# Patient Record
Sex: FEMALE | Race: BLACK OR AFRICAN AMERICAN | NOT HISPANIC OR LATINO | URBAN - METROPOLITAN AREA
[De-identification: names, ages, dates, MRNs, and addresses within clinical notes are randomized per-mention and may not be internally consistent; named-entity substitution may affect disease eponyms.]

---

## 2024-01-27 PROCEDURE — 87086 URINE CULTURE/COLONY COUNT: CPT

## 2024-01-28 ENCOUNTER — LAB REQUISITION (OUTPATIENT)
Dept: LAB | Facility: HOSPITAL | Age: 54
End: 2024-01-28

## 2024-01-28 DIAGNOSIS — N39.0 URINARY TRACT INFECTION, SITE NOT SPECIFIED: ICD-10-CM

## 2024-01-29 LAB — BACTERIA UR CULT: NORMAL

## 2025-06-14 ENCOUNTER — OFFICE VISIT (OUTPATIENT)
Dept: URGENT CARE | Age: 55
End: 2025-06-14
Payer: COMMERCIAL

## 2025-06-14 VITALS
HEART RATE: 80 BPM | DIASTOLIC BLOOD PRESSURE: 110 MMHG | SYSTOLIC BLOOD PRESSURE: 140 MMHG | TEMPERATURE: 98 F | WEIGHT: 188.93 LBS | BODY MASS INDEX: 29.59 KG/M2 | OXYGEN SATURATION: 99 %

## 2025-06-14 DIAGNOSIS — M79.604 LEG PAIN, ANTERIOR, RIGHT: Primary | ICD-10-CM

## 2025-06-14 DIAGNOSIS — M62.838 MUSCLE SPASM: ICD-10-CM

## 2025-06-14 RX ORDER — FLUOXETINE 20 MG/1
20 CAPSULE ORAL DAILY
COMMUNITY

## 2025-06-14 RX ORDER — CYCLOBENZAPRINE HCL 10 MG
10 TABLET ORAL NIGHTLY PRN
Qty: 10 TABLET | Refills: 0 | Status: SHIPPED | OUTPATIENT
Start: 2025-06-14

## 2025-06-14 RX ORDER — HYDROCHLOROTHIAZIDE 25 MG/1
25 TABLET ORAL DAILY
COMMUNITY

## 2025-06-14 RX ORDER — AMLODIPINE BESYLATE 10 MG/1
TABLET ORAL DAILY
COMMUNITY

## 2025-06-14 RX ORDER — LOSARTAN POTASSIUM 50 MG/1
50 TABLET ORAL DAILY
COMMUNITY

## 2025-06-14 RX ORDER — METFORMIN HYDROCHLORIDE 500 MG/1
500 TABLET ORAL
COMMUNITY

## 2025-06-14 RX ORDER — DULOXETIN HYDROCHLORIDE 60 MG/1
60 CAPSULE, DELAYED RELEASE ORAL DAILY
COMMUNITY

## 2025-06-14 RX ORDER — IBUPROFEN 800 MG/1
800 TABLET, FILM COATED ORAL EVERY 8 HOURS PRN
Qty: 30 TABLET | Refills: 0 | Status: SHIPPED | OUTPATIENT
Start: 2025-06-14 | End: 2025-06-24

## 2025-06-14 RX ORDER — PRAVASTATIN SODIUM 40 MG/1
TABLET ORAL NIGHTLY
COMMUNITY

## 2025-06-14 ASSESSMENT — PATIENT HEALTH QUESTIONNAIRE - PHQ9
1. LITTLE INTEREST OR PLEASURE IN DOING THINGS: NOT AT ALL
SUM OF ALL RESPONSES TO PHQ9 QUESTIONS 1 AND 2: 0
2. FEELING DOWN, DEPRESSED OR HOPELESS: NOT AT ALL

## 2025-06-14 NOTE — PROGRESS NOTES
Subjective   Patient ID: Sherrill Hawkins is a 54 y.o. female. They present today with a chief complaint of Fatigue (Fatigue, right upper leg muscle pain and soreness starting 1 week ago.). Advil alleviated the pain mildly       Past Medical History  Allergies as of 06/14/2025 - Reviewed 06/14/2025   Allergen Reaction Noted    Penicillin Rash 06/14/2025       Prescriptions Prior to Admission[1]     Medical History[2]    Surgical History[3]     reports that she has never smoked. She has never used smokeless tobacco. She reports that she does not drink alcohol.    Review of Systems  Review of Systems                               Objective    Vitals:    06/14/25 1340   BP: (!) 140/110   Pulse: 80   Temp: 36.7 °C (98 °F)   SpO2: 99%   Weight: 85.7 kg (188 lb 15 oz)     No LMP recorded. Patient has had a hysterectomy.    Physical Exam  Vitals reviewed.   Constitutional:       General: She is not in acute distress.  HENT:      Head: Normocephalic and atraumatic.      Nose: Nose normal.      Mouth/Throat:      Mouth: Mucous membranes are moist.   Eyes:      Extraocular Movements: Extraocular movements intact.      Conjunctiva/sclera: Conjunctivae normal.   Pulmonary:      Effort: Pulmonary effort is normal.   Musculoskeletal:      Right hip: Normal.      Right upper leg: Normal.      Right knee: Normal.      Right lower leg: Normal.      Right ankle: Normal.   Skin:     General: Skin is warm.   Neurological:      Mental Status: She is alert and oriented to person, place, and time.   Psychiatric:         Mood and Affect: Mood normal.         Behavior: Behavior normal.             Point of Care Test & Imaging Results from this visit  No results found for this visit on 06/14/25.   Imaging  No results found.    Cardiology, Vascular, and Other Imaging  No other imaging results found for the past 2 days      Diagnostic study results (if any) were reviewed by Alessio Munoz PA-C.    Assessment/Plan   Allergies, medications,  history, and pertinent labs/EKGs/Imaging reviewed by Alessio Munoz PA-C.     Medical Decision Making  -         Patient is educated about their diagnoses.     -          Discussed medications benefits and adverse effects.     -          Answered all patient’s questions.     -          Patient will call 911 or go to the nearest ED if worsen symptoms .     -          Patient is agreeable to the plan of care and is deemed stable upon discharge.     -          Follow up with your primary care provider in two days.      Orders and Diagnoses  Diagnoses and all orders for this visit:  Leg pain, anterior, right  -     ibuprofen 800 mg tablet; Take 1 tablet (800 mg) by mouth every 8 hours if needed for mild pain (1 - 3) for up to 10 days.  -     cyclobenzaprine (Flexeril) 10 mg tablet; Take 1 tablet (10 mg) by mouth as needed at bedtime for muscle spasms.  Muscle spasm  -     ibuprofen 800 mg tablet; Take 1 tablet (800 mg) by mouth every 8 hours if needed for mild pain (1 - 3) for up to 10 days.  -     cyclobenzaprine (Flexeril) 10 mg tablet; Take 1 tablet (10 mg) by mouth as needed at bedtime for muscle spasms.      Medical Admin Record      Patient disposition: Home    Electronically signed by Alessio Munoz PA-C  2:00 PM           [1] (Not in a hospital admission)   [2] History reviewed. No pertinent past medical history.  [3] History reviewed. No pertinent surgical history.